# Patient Record
Sex: MALE | Race: WHITE | NOT HISPANIC OR LATINO | Employment: FULL TIME | URBAN - METROPOLITAN AREA
[De-identification: names, ages, dates, MRNs, and addresses within clinical notes are randomized per-mention and may not be internally consistent; named-entity substitution may affect disease eponyms.]

---

## 2024-01-31 ENCOUNTER — OFFICE VISIT (OUTPATIENT)
Dept: PRIMARY CARE | Facility: CLINIC | Age: 54
End: 2024-01-31
Payer: COMMERCIAL

## 2024-01-31 VITALS
SYSTOLIC BLOOD PRESSURE: 136 MMHG | HEIGHT: 67 IN | BODY MASS INDEX: 30.64 KG/M2 | HEART RATE: 75 BPM | WEIGHT: 195.2 LBS | DIASTOLIC BLOOD PRESSURE: 85 MMHG

## 2024-01-31 DIAGNOSIS — D86.0 PULMONARY SARCOIDOSIS (MULTI): ICD-10-CM

## 2024-01-31 DIAGNOSIS — R03.0 ELEVATED BLOOD PRESSURE READING IN OFFICE WITHOUT DIAGNOSIS OF HYPERTENSION: ICD-10-CM

## 2024-01-31 DIAGNOSIS — Z23 NEED FOR SHINGLES VACCINE: ICD-10-CM

## 2024-01-31 DIAGNOSIS — Z12.5 PROSTATE CANCER SCREENING: ICD-10-CM

## 2024-01-31 DIAGNOSIS — R79.9 ABNORMAL BLOOD CHEMISTRY: ICD-10-CM

## 2024-01-31 DIAGNOSIS — Z00.00 ROUTINE GENERAL MEDICAL EXAMINATION AT A HEALTH CARE FACILITY: Primary | ICD-10-CM

## 2024-01-31 PROCEDURE — 90750 HZV VACC RECOMBINANT IM: CPT | Performed by: INTERNAL MEDICINE

## 2024-01-31 PROCEDURE — 90471 IMMUNIZATION ADMIN: CPT | Performed by: INTERNAL MEDICINE

## 2024-01-31 PROCEDURE — 99386 PREV VISIT NEW AGE 40-64: CPT | Performed by: INTERNAL MEDICINE

## 2024-01-31 PROCEDURE — 1036F TOBACCO NON-USER: CPT | Performed by: INTERNAL MEDICINE

## 2024-01-31 ASSESSMENT — ENCOUNTER SYMPTOMS
DEPRESSION: 0
OCCASIONAL FEELINGS OF UNSTEADINESS: 0
LOSS OF SENSATION IN FEET: 0

## 2024-01-31 NOTE — PROGRESS NOTES
Subjective   Patient ID: Subhash Rasmussen is a 53 y.o. male who presents for Annual Exam.    HPI  Patient here to establish care had seen his previous doctor in [facility] [place] Barry  Was being followed by pulmonary for his sarcoidosis , 2019 was on methotrexate   Has not had issues since then     Patient comes in for a physical exam last one done over a year ago , doing well over-all with no particular complaints. Also is in for laboratory review and health maintenance update.  Updating family history as well.  Interval event - past medical history, surgical, social, and family history reviewed and updated.  Interval care -  Patient is    up to date with dental care.  Patient does     receive routine vision care.    Review of Systems    General: Denies fever, chills, night sweats, changes in appetite or weight  ENT: Negative for ear pain, hearing loss, headache, difficulty swallowing, up to date with dental checks   Eyes: Negative for recent visual changes, up to date with eye exams  Dermatologic: Negative for new skin conditions, rash  Respiratory: Negative for paroxysmal nocturnal dyspnea, wheezing,shortness of breath, cough  Cardiovascular: Negative for chest pain, palpitations, or leg swelling  Gastrointestinal: Negative for nausea/vomiting, abdominal pain, changes in bowel habits  Genitourinary: Negative for dysuria, urgency, frequency  URINARY INCONTINENCE   Neurological: Negative for headaches, tremors, dizziness, memory loss, confusion, weakness, paresthesias  Psychiatric: Negative for sleep problems, anxiety, depression, conditions are stable  Endocrine: Negative for heat or cold intolerance, polyuria, polydipsia  Other:All systems have been reviewed and are negative except as previously noted.    Previous history  History reviewed. No pertinent past medical history.  Past Surgical History:   Procedure Laterality Date    CHOLECYSTECTOMY      KNEE ARTHROSCOPY W/ ACL RECONSTRUCTION       Social History     Tobacco Use     Smoking status: Never    Smokeless tobacco: Never   Vaping Use    Vaping Use: Never used   Substance Use Topics    Alcohol use: Yes     Comment: once a week    Drug use: Never     Family History   Problem Relation Name Age of Onset    Diabetes Other       Allergies   Allergen Reactions    Penicillins Rash     No current outpatient medications    Objective       Physical Exam  Vital Signs: as recorded above  General: Well groomed, well nourished   Orientation:  Alert , oriented to time, place , and person   Mood and Affect:  Cooperative , no apparent distress normal affect  Skin: Good color, good turgor  Eyes: Extra ocular muscle movements intact, anicteric sclerae  Neck: Supple, full range of movement  Chest: Normal breath sounds, normal chest wall exam, symmetric, good air entry, clear to auscultation  Heart: Regular rate and rhythm, without murmur, gallop, or rubs  Abdomen soft nontender no masses felt no hepatosplenomegaly, no rebound or guarding  BACK:  no CTLS spine tenderness, no flank tenderness  Extremities: full range of movement  bilateral UE and bilateral LE,  no lower extremity edema  Neurological: Alert, oriented, cranial nerves II-XII intact except for visual acuity  Sensation:  Intact   Gait: normal steady      Assessment/Plan   Subhash Rasmussen is a 53 y.o. male who presents for the concerns below:    Problem List Items Addressed This Visit    None    Fall slipped off his truck icy the other day   Pulmonary sarcoidosis no doctors visits since 2020 , last visit with pulmonary 2019 was on methotrexate and intermittent steroids , will re-establish  ELEVATED BLOOD PRESSURE PLAN not optimal control of blood pressure not on medication, need to optimize exercise weight loss no added salt hydration restorative sleep ,   patient does not smoke or drink etoh , if with any caffeine intake to cut down  .   DASH diet to lower blood pressure is printed and given to patient    if bp on recheck is still high we may  need to start medication to lower blood pressure  make sure renal function tests are up to date if not Obtain BMP, urine microalbumin, ophthalmology exam  if with persistent headache, dizziness spells, blurred vision     ASSESSMENT AND PLAN: Patient on examination is in good health, will do screening blood tests to screen for high cholesterol, diabetes, thyroid, Ekg if above 50 years old or earlier if with cardiac history. Patient should be taking enough calcium in a balanced diet For Male Patients Only:  Prostate cancer screening PSA .Preventive Medicine: colon cancer screening by age 50 if no family history, his last one was onde in 2019 one hyperplastic polyp  balanced diet, and exercise as discussed. Seat belt use for injury prevention, living will. Substance use and /or tobacco use counseled when applicable. Alcohol use discussed, use designated . Immunizations TD age 50 and every 10 years. Pneumovax and shingles vaccine counseled. Yearly flu vaccine unless contraindicated. More than 50% of office visit time spent counseling the patient, questions were answered. If problems arise, patient is to call or come back in. It is understood that the responsibility of healthcare is shared by the patient by following a healthy lifestyle and following the plan above as discussed. Complete physical examination in a year.  Patient knows to call for lab results in two weeks if they do not receive letter or call from our office.        Discussed with:   Return in : 4 months    Portions of this note were generated using digital voice recognition software, and may contain grammatical errors       Andrew Cortes MD  01/31/24  3:20 PMPatient was identified as a fall risk. Risk prevention instructions provided.